# Patient Record
(demographics unavailable — no encounter records)

---

## 2024-12-09 NOTE — HISTORY OF PRESENT ILLNESS
[FreeTextEntry8] : G26 week pregant, has 2yo son, 8 yo daughter walks up and down stars with pain from bacl and radaitng like a band to front Walks 5 long blocks with some pressure in the abdomen Routine OB check ups normal with f/u  -Right handed-noted sensation diminished in right hand but more sensitive in left hand, worse at night in which she feels numbness in right hand. Works as  and with animals at [employer].

## 2025-05-06 NOTE — HISTORY OF PRESENT ILLNESS
[Complications:___] : no complications [Last Pap Date: ___] : Last Pap Date: [unfilled] Alcohol abuse    Anxiety    Depression    No pertinent past medical history    Overdose  heroine and xanax 3/10/2015 [Delivery Date: ___] : on [unfilled] [Primary C/S] : delivered by  section [Female] : Delivery History: baby girl [Wt. ___] : weighing [unfilled] [Breastfeeding] : currently nursing [Discharge HGB: ___] : hemoglobin level was [unfilled] [Resumed Menses] : has not resumed her menses [Resumed Evans] : has not resumed intercourse [None] : No associated symptoms are reported [Clean/Dry/Intact] : clean, dry and intact [Healed] : healed [Back to Normal] : is back to normal in size [Examination Of The Breasts] : breasts are normal [FreeTextEntry8] : 31yo P3 s/p LTCS (arrest/ cat 2 tracing) 3/27/25 presents for pp exam/  denies pp depression/  Breast and bottle feeding- menses not yet returned.  Desires to restart OCPs 9has used in past/ no contraindications) .  Pt stopped FESO$  9anemia in preg/  d/c hgb 8/5) [de-identified] : panus fold:  glistening red c/w tinea [de-identified] : 33yo P3- s/p LTCS for pp exam [de-identified] : 1. anemia [ ]CBC, cont FESO4/vit C   2. contraception: re-start Sprintec   3.  tinea- nystatin rx   RTC for holden/prn [place], PAC